# Patient Record
Sex: FEMALE | URBAN - METROPOLITAN AREA
[De-identification: names, ages, dates, MRNs, and addresses within clinical notes are randomized per-mention and may not be internally consistent; named-entity substitution may affect disease eponyms.]

---

## 2018-06-07 ENCOUNTER — OFFICE VISIT (OUTPATIENT)
Dept: URBAN - METROPOLITAN AREA CLINIC 48 | Facility: CLINIC | Age: 63
End: 2018-06-07
Payer: COMMERCIAL

## 2018-06-07 DIAGNOSIS — H02.834 DERMATOCHALASIS OF LEFT UPPER EYELID: ICD-10-CM

## 2018-06-07 DIAGNOSIS — H02.831 DERMATOCHALASIS OF RIGHT UPPER EYELID: Primary | ICD-10-CM

## 2018-06-07 PROCEDURE — 92081 LIMITED VISUAL FIELD XM: CPT | Performed by: OPHTHALMOLOGY

## 2018-06-07 PROCEDURE — 99214 OFFICE O/P EST MOD 30 MIN: CPT | Performed by: OPHTHALMOLOGY

## 2018-06-07 ASSESSMENT — INTRAOCULAR PRESSURE
OD: 14
OS: 14

## 2018-06-07 NOTE — IMPRESSION/PLAN
Impression: Dermatochalasis of right upper eyelid: H02.831. Plan: Discussed diagnosis in detail with patient. Discussed treatment options with patient. HVF does not meet criteria. Retest 1 year. Patient can opt for cosmetic sx.

## 2019-02-13 ENCOUNTER — OFFICE VISIT (OUTPATIENT)
Dept: URBAN - METROPOLITAN AREA CLINIC 48 | Facility: CLINIC | Age: 64
End: 2019-02-13
Payer: COMMERCIAL

## 2019-02-13 DIAGNOSIS — H43.811 VITREOUS DETACHMENT OF RIGHT EYE: ICD-10-CM

## 2019-02-13 DIAGNOSIS — E11.9 TYPE 2 DIABETES MELLITUS WITHOUT COMPLICATIONS: Primary | ICD-10-CM

## 2019-02-13 PROCEDURE — 92014 COMPRE OPH EXAM EST PT 1/>: CPT | Performed by: OPTOMETRIST

## 2019-02-13 ASSESSMENT — INTRAOCULAR PRESSURE
OS: 13
OD: 12

## 2019-02-13 NOTE — IMPRESSION/PLAN
Impression: Vitreous detachment of right eye: H43.811. Plan: Discussed Dx of posterior vitreous detachment. Discussed signs and symptoms of retinal tear/detachment. Pt to RTC PRN with any change to visual status. R/C 4-6 weeks for DFE to ensure no changes.

## 2019-02-13 NOTE — IMPRESSION/PLAN
Impression: Type 2 diabetes mellitus without complications: C20.0. Plan: No retinopathy found on today's examination. Discussed importance of blood sugar, blood pressure and cholesterol control. Letter with today's examination results sent to managing provider.  RTC 1 year for [facility] SERVICES [facility]

## 2019-03-21 ENCOUNTER — OFFICE VISIT (OUTPATIENT)
Dept: URBAN - METROPOLITAN AREA CLINIC 48 | Facility: CLINIC | Age: 64
End: 2019-03-21
Payer: COMMERCIAL

## 2019-03-21 DIAGNOSIS — H43.813 VITREOUS DEGENERATION, BILATERAL: Primary | ICD-10-CM

## 2019-03-21 DIAGNOSIS — H25.813 COMBINED FORMS OF AGE-RELATED CATARACT, BILATERAL: ICD-10-CM

## 2019-03-21 PROCEDURE — 92134 CPTRZ OPH DX IMG PST SGM RTA: CPT | Performed by: OPTOMETRIST

## 2019-03-21 PROCEDURE — 92014 COMPRE OPH EXAM EST PT 1/>: CPT | Performed by: OPTOMETRIST

## 2019-03-21 ASSESSMENT — INTRAOCULAR PRESSURE
OD: 14
OS: 14

## 2019-03-21 NOTE — IMPRESSION/PLAN
Impression: Combined forms of age-related cataract, bilateral: H25.813. OD>OS - early [facility] OD Plan: Discussed signs and symptoms of cataract progression. Pt to RTC PRN in the event of changes to visual status. No recommendation for surgery at present time. Will continue to monitor. RTC 6 months cat eval - or PRN if vision worsens.

## 2019-03-21 NOTE — IMPRESSION/PLAN
Impression: Type 2 diabetes mellitus without complications: K71.5. Plan: No retinopathy found on today's examination. Discussed importance of blood sugar, blood pressure and cholesterol control. Letter with today's examination results sent to managing provider.

## 2019-03-21 NOTE — IMPRESSION/PLAN
Impression: Vitreous degeneration, bilateral: H43.813. PVD OU - stable Plan: Discussed Dx of posterior vitreous detachment. Discussed signs and symptoms of retinal tear/detachment. Pt to RTC PRN with any change to visual status.

## 2019-11-18 ENCOUNTER — OFFICE VISIT (OUTPATIENT)
Dept: URBAN - METROPOLITAN AREA CLINIC 48 | Facility: CLINIC | Age: 64
End: 2019-11-18
Payer: COMMERCIAL

## 2019-11-18 DIAGNOSIS — H25.13 AGE-RELATED NUCLEAR CATARACT, BILATERAL: Primary | ICD-10-CM

## 2019-11-18 PROCEDURE — 92014 COMPRE OPH EXAM EST PT 1/>: CPT | Performed by: OPHTHALMOLOGY

## 2019-11-18 ASSESSMENT — VISUAL ACUITY
OS: 20/40
OD: 20/25

## 2019-11-18 ASSESSMENT — INTRAOCULAR PRESSURE
OD: 13
OS: 13

## 2019-11-18 ASSESSMENT — KERATOMETRY
OD: 43.25
OS: 42.88

## 2019-11-18 NOTE — IMPRESSION/PLAN
Impression: Age-related nuclear cataract, bilateral: H25.13. Plan: Discussed and reviewed diagnosis with patient, understood by patient. Cataract is not visually significant to patient, will continue to monitor. 

RTC 1 year f/u

## 2020-02-11 ENCOUNTER — OFFICE VISIT (OUTPATIENT)
Dept: URBAN - METROPOLITAN AREA CLINIC 48 | Facility: CLINIC | Age: 65
End: 2020-02-11
Payer: COMMERCIAL

## 2020-02-11 PROCEDURE — 92014 COMPRE OPH EXAM EST PT 1/>: CPT | Performed by: OPTOMETRIST

## 2020-02-11 ASSESSMENT — INTRAOCULAR PRESSURE
OS: 16
OD: 16

## 2020-02-11 NOTE — IMPRESSION/PLAN
Impression: Vitreous degeneration, bilateral: H43.813. Increase in floaters OD, potentially a partial detachment but is now advancing. Plan: RD precautions discussed with patient. Advised patient to call the office for increased flashes of light/floaters.

## 2020-03-12 ENCOUNTER — OFFICE VISIT (OUTPATIENT)
Dept: URBAN - METROPOLITAN AREA CLINIC 48 | Facility: CLINIC | Age: 65
End: 2020-03-12
Payer: COMMERCIAL

## 2020-03-12 PROCEDURE — 92014 COMPRE OPH EXAM EST PT 1/>: CPT | Performed by: OPTOMETRIST

## 2020-03-12 ASSESSMENT — INTRAOCULAR PRESSURE
OD: 17
OS: 18

## 2020-03-12 NOTE — IMPRESSION/PLAN
Impression: Vitreous degeneration, bilateral: H43.813. Plan: Discuss with patient. 

RTC in 9 months DFE for DM, possible cat eval

## 2020-03-12 NOTE — IMPRESSION/PLAN
Impression: Combined forms of age-related cataract, bilateral: H25.813. Plan: Discussed with patient, effects of cataracts. Will monitor for now. 

see plan 1

## 2020-07-16 ENCOUNTER — OFFICE VISIT (OUTPATIENT)
Dept: URBAN - METROPOLITAN AREA CLINIC 48 | Facility: CLINIC | Age: 65
End: 2020-07-16
Payer: COMMERCIAL

## 2020-07-16 PROCEDURE — 92014 COMPRE OPH EXAM EST PT 1/>: CPT | Performed by: OPTOMETRIST

## 2020-07-16 PROCEDURE — 92134 CPTRZ OPH DX IMG PST SGM RTA: CPT | Performed by: OPTOMETRIST

## 2020-07-16 ASSESSMENT — INTRAOCULAR PRESSURE
OD: 15
OS: 12

## 2020-07-16 NOTE — IMPRESSION/PLAN
Impression: Vitreous degeneration, bilateral: H43.813. very strong reaction OS with floaters, flashes, and decreased vision With [facility] patient was seeing 20/25-, blur is probably from the floaters or possibly from recent VMT Because of strong response, we will monitor patient more closely Plan: Discuss with patient. RD precautions given.  

RTC 2-3 weeks DFE with Dr. Agata Kimble

## 2020-08-07 ENCOUNTER — OFFICE VISIT (OUTPATIENT)
Dept: URBAN - METROPOLITAN AREA CLINIC 48 | Facility: CLINIC | Age: 65
End: 2020-08-07
Payer: COMMERCIAL

## 2020-08-07 PROCEDURE — 92014 COMPRE OPH EXAM EST PT 1/>: CPT | Performed by: OPHTHALMOLOGY

## 2020-08-07 ASSESSMENT — INTRAOCULAR PRESSURE
OD: 17
OS: 15

## 2020-08-07 NOTE — IMPRESSION/PLAN
Impression: Vitreous degeneration, bilateral: H43.813. Plan: depressed to aura 360 degrees U, no tears no RD. Posterior vitreous detachment accounts for the patient's complaints. There is no evidence of retinal pathology. All signs and risks of retinal detachment and tears were discussed in detail. Patient instructed to call the office immediately if any symptoms noted.   

RTC 6 months DE

## 2020-08-07 NOTE — IMPRESSION/PLAN
Impression: Combined forms of age-related cataract, bilateral: H25.813. Plan: May be cause of decrease vision OS.  


RTC 6 months CAt eval if patient is symptomatic

## 2021-02-10 ENCOUNTER — OFFICE VISIT (OUTPATIENT)
Dept: URBAN - METROPOLITAN AREA CLINIC 48 | Facility: CLINIC | Age: 66
End: 2021-02-10
Payer: COMMERCIAL

## 2021-02-10 PROCEDURE — 92014 COMPRE OPH EXAM EST PT 1/>: CPT | Performed by: OPHTHALMOLOGY

## 2021-02-10 ASSESSMENT — INTRAOCULAR PRESSURE
OD: 14
OS: 12

## 2021-02-10 NOTE — IMPRESSION/PLAN
Impression: Dry eye syndrome of bilateral lacrimal glands: H04.123. Plan: Recommend patient to use AFT 1-4 times a day OU ( brand name preferred). Hand out of different Dry eye treatment recommendations given to patient.  


 follow up

## 2021-02-10 NOTE — IMPRESSION/PLAN
Impression: Vitreous degeneration, bilateral: H43.813. No retina breaks  Plan: Posterior vitreous detachment accounts for the patient's complaints. There is no evidence of retinal pathology. All signs and risks of retinal detachment and tears were discussed in detail. Patient instructed to call the office immediately if any symptoms noted.   

RTC 3 months DE OU

## 2021-05-11 ENCOUNTER — OFFICE VISIT (OUTPATIENT)
Dept: URBAN - METROPOLITAN AREA CLINIC 48 | Facility: CLINIC | Age: 66
End: 2021-05-11
Payer: COMMERCIAL

## 2021-05-11 PROCEDURE — 92014 COMPRE OPH EXAM EST PT 1/>: CPT | Performed by: OPHTHALMOLOGY

## 2021-05-11 ASSESSMENT — KERATOMETRY
OD: 43.13
OS: 42.88

## 2021-05-11 NOTE — IMPRESSION/PLAN
Impression: Age-related nuclear cataract, bilateral: H25.13. Plan: Symptoms presented today associated with dry eye and does not qualify for sx at this time. Will treat dry eye first then reassess cataracts. Patient aware to call clinic prior to 9 months if new problems or concerns arise prior to next scheduled appt. RTC 9 months for CAT EVAL.

## 2021-05-11 NOTE — IMPRESSION/PLAN
Impression: Dry eye syndrome of bilateral lacrimal glands: H04.123. Plan: Recommend patient to use AFT at least QD and up to QID OU ( brand name preferred). Hand out of different Dry eye treatment recommendations given to patient. Patient aware to call clinic if condition does not improve.  

see plan 1

## 2022-03-16 ENCOUNTER — OFFICE VISIT (OUTPATIENT)
Dept: URBAN - METROPOLITAN AREA CLINIC 48 | Facility: CLINIC | Age: 67
End: 2022-03-16
Payer: COMMERCIAL

## 2022-03-16 DIAGNOSIS — H40.013 OPEN ANGLE WITH BORDERLINE FINDINGS, LOW RISK, BILATERAL: ICD-10-CM

## 2022-03-16 DIAGNOSIS — H04.123 DRY EYE SYNDROME OF BILATERAL LACRIMAL GLANDS: ICD-10-CM

## 2022-03-16 PROCEDURE — 92014 COMPRE OPH EXAM EST PT 1/>: CPT | Performed by: OPHTHALMOLOGY

## 2022-03-16 ASSESSMENT — VISUAL ACUITY
OD: 20/20
OS: 20/20

## 2022-03-16 ASSESSMENT — KERATOMETRY
OD: 43.13
OS: 42.88

## 2022-03-16 ASSESSMENT — INTRAOCULAR PRESSURE
OS: 11
OD: 14

## 2022-03-16 NOTE — IMPRESSION/PLAN
Impression: Type 2 diabetes mellitus without complications: I63.0.  Plan: No diabetic retinopathy on today's exam

Recommend yearly diabetic exam with Dr. Walt Varghese

## 2022-03-16 NOTE — IMPRESSION/PLAN
Impression: Dry eye syndrome of bilateral lacrimal glands: H04.123. Plan: Recommend patient to use AFT 1-4 times a day OU ( brand name preferred). Hand out of different Dry eye treatment recommendations given to patient. start Pat[place] 0.7 % QD OU [place] 1-4 times a day OU 


monitor

## 2022-03-16 NOTE — IMPRESSION/PLAN
Impression: Open angle with borderline findings, low risk, bilateral: H40.013. Plan: CDR asymmetry RTC 3 months IOP check with OCT nerve

## 2022-03-16 NOTE — IMPRESSION/PLAN
Impression: Age-related nuclear cataract, bilateral: H25.13. Plan: Not visually significant to patient Will monitor RTC 3 months cat eval 
patient very symptomatic

## 2022-07-05 ENCOUNTER — OFFICE VISIT (OUTPATIENT)
Dept: URBAN - METROPOLITAN AREA CLINIC 48 | Facility: CLINIC | Age: 67
End: 2022-07-05
Payer: COMMERCIAL

## 2022-07-05 DIAGNOSIS — E11.9 TYPE 2 DIABETES MELLITUS WITHOUT COMPLICATIONS: ICD-10-CM

## 2022-07-05 DIAGNOSIS — H02.831 DERMATOCHALASIS OF RIGHT UPPER EYELID: ICD-10-CM

## 2022-07-05 DIAGNOSIS — H40.013 OPEN ANGLE WITH BORDERLINE FINDINGS, LOW RISK, BILATERAL: Primary | ICD-10-CM

## 2022-07-05 DIAGNOSIS — H04.123 DRY EYE SYNDROME OF BILATERAL LACRIMAL GLANDS: ICD-10-CM

## 2022-07-05 DIAGNOSIS — H02.834 DERMATOCHALASIS OF LEFT UPPER EYELID: ICD-10-CM

## 2022-07-05 PROCEDURE — 99214 OFFICE O/P EST MOD 30 MIN: CPT | Performed by: OPHTHALMOLOGY

## 2022-07-05 ASSESSMENT — VISUAL ACUITY
OS: 20/25
OD: 20/25

## 2022-07-05 ASSESSMENT — INTRAOCULAR PRESSURE
OD: 14
OS: 13

## 2022-07-05 NOTE — IMPRESSION/PLAN
Impression: Dry eye syndrome of bilateral lacrimal glands: H04.123.  Plan: Blink up to 4 times a day

## 2022-07-05 NOTE — IMPRESSION/PLAN
Impression: Type 2 diabetes mellitus without complications: H63.5.  Plan: No diabetic retinopathy on today's exam

Recommend yearly diabetic exam with Dr. Jono Reynolds

## 2022-07-05 NOTE — IMPRESSION/PLAN
Impression: Dermatochalasis of right upper eyelid: H02.831.  Plan: Refer to Oculoplastic next available with VF ptosis prior

## 2022-07-05 NOTE — IMPRESSION/PLAN
Impression: Open angle with borderline findings, low risk, bilateral: H40.013.  Plan: Monitor off drops

## 2023-02-17 ENCOUNTER — OFFICE VISIT (OUTPATIENT)
Dept: URBAN - METROPOLITAN AREA CLINIC 48 | Facility: CLINIC | Age: 68
End: 2023-02-17
Payer: COMMERCIAL

## 2023-02-17 DIAGNOSIS — H25.13 AGE-RELATED NUCLEAR CATARACT, BILATERAL: ICD-10-CM

## 2023-02-17 DIAGNOSIS — H40.013 OPEN ANGLE WITH BORDERLINE FINDINGS, LOW RISK, BILATERAL: ICD-10-CM

## 2023-02-17 DIAGNOSIS — E11.9 DIABETES MELLITUS TYPE 2 WITHOUT MENTION OF COMPLICATION: Primary | ICD-10-CM

## 2023-02-17 PROCEDURE — 99214 OFFICE O/P EST MOD 30 MIN: CPT | Performed by: OPHTHALMOLOGY

## 2023-02-17 ASSESSMENT — INTRAOCULAR PRESSURE
OD: 13
OS: 13

## 2023-02-17 NOTE — IMPRESSION/PLAN
Impression: Open angle with borderline findings, low risk, bilateral: H40.013. Plan: Continue to monitor off drops at this time, discussed diagnosis with patient, patient understands.  

RTC 6mo IOP check, VF

## 2023-02-17 NOTE — IMPRESSION/PLAN
Impression: Diabetes mellitus Type 2 without mention of complication: T75.1. Plan: Diabetes type II: no background retinopathy, no signs of neovascularization noted. Discussed ocular and systemic benefits of blood sugar control. 

RTC 1yr DE

## 2023-02-17 NOTE — IMPRESSION/PLAN
Impression: Age-related nuclear cataract, bilateral: H25.13. Plan: Visually significant per patient.  Recommend Eval

RTC 1-3wk CAT EVal w/o DIL

## 2023-03-29 ENCOUNTER — OFFICE VISIT (OUTPATIENT)
Dept: URBAN - METROPOLITAN AREA CLINIC 48 | Facility: CLINIC | Age: 68
End: 2023-03-29
Payer: COMMERCIAL

## 2023-03-29 DIAGNOSIS — H25.13 AGE-RELATED NUCLEAR CATARACT, BILATERAL: Primary | ICD-10-CM

## 2023-03-29 PROCEDURE — 99214 OFFICE O/P EST MOD 30 MIN: CPT | Performed by: OPHTHALMOLOGY

## 2023-03-29 ASSESSMENT — KERATOMETRY
OD: 43.25
OS: 42.88

## 2023-03-29 ASSESSMENT — VISUAL ACUITY
OS: 20/20
OD: 20/20

## 2023-03-29 ASSESSMENT — INTRAOCULAR PRESSURE
OD: 17
OS: 22

## 2023-08-29 ENCOUNTER — OFFICE VISIT (OUTPATIENT)
Dept: URBAN - METROPOLITAN AREA CLINIC 48 | Facility: CLINIC | Age: 68
End: 2023-08-29
Payer: COMMERCIAL

## 2023-08-29 DIAGNOSIS — H40.013 OPEN ANGLE WITH BORDERLINE FINDINGS, LOW RISK, BILATERAL: Primary | ICD-10-CM

## 2023-08-29 DIAGNOSIS — H25.813 COMBINED FORMS OF AGE-RELATED CATARACT, BILATERAL: ICD-10-CM

## 2023-08-29 PROCEDURE — 76514 ECHO EXAM OF EYE THICKNESS: CPT | Performed by: OPHTHALMOLOGY

## 2023-08-29 PROCEDURE — 99214 OFFICE O/P EST MOD 30 MIN: CPT | Performed by: OPHTHALMOLOGY

## 2023-08-29 PROCEDURE — 92133 CPTRZD OPH DX IMG PST SGM ON: CPT | Performed by: OPHTHALMOLOGY

## 2023-08-29 PROCEDURE — 92083 EXTENDED VISUAL FIELD XM: CPT | Performed by: OPHTHALMOLOGY

## 2023-08-29 ASSESSMENT — INTRAOCULAR PRESSURE
OD: 17
OS: 16

## 2023-11-30 ENCOUNTER — OFFICE VISIT (OUTPATIENT)
Dept: URBAN - METROPOLITAN AREA CLINIC 48 | Facility: CLINIC | Age: 68
End: 2023-11-30
Payer: COMMERCIAL

## 2023-11-30 DIAGNOSIS — H25.11 AGE-RELATED NUCLEAR CATARACT, RIGHT EYE: ICD-10-CM

## 2023-11-30 PROCEDURE — 99214 OFFICE O/P EST MOD 30 MIN: CPT | Performed by: OPHTHALMOLOGY

## 2023-11-30 ASSESSMENT — VISUAL ACUITY
OD: 20/20
OS: 20/20

## 2023-11-30 ASSESSMENT — KERATOMETRY
OS: 42.88
OD: 43.25

## 2023-11-30 ASSESSMENT — INTRAOCULAR PRESSURE
OS: 14
OD: 17

## 2024-01-10 ENCOUNTER — TECH ONLY (OUTPATIENT)
Dept: URBAN - METROPOLITAN AREA CLINIC 48 | Facility: CLINIC | Age: 69
End: 2024-01-10
Payer: COMMERCIAL

## 2024-01-10 DIAGNOSIS — H25.13 AGE-RELATED NUCLEAR CATARACT, BILATERAL: Primary | ICD-10-CM

## 2024-01-10 RX ORDER — MOXIFLOXACIN 5 MG/ML
0.5 % SOLUTION/ DROPS OPHTHALMIC
Qty: 5 | Refills: 2 | Status: ACTIVE
Start: 2024-01-10

## 2024-01-10 RX ORDER — KETOROLAC TROMETHAMINE 5 MG/ML
0.5 % SOLUTION OPHTHALMIC
Qty: 5 | Refills: 2 | Status: ACTIVE
Start: 2024-01-10

## 2024-01-10 RX ORDER — PREDNISOLONE ACETATE 10 MG/ML
1 % SUSPENSION/ DROPS OPHTHALMIC
Qty: 5 | Refills: 2 | Status: ACTIVE
Start: 2024-01-10

## 2024-01-10 ASSESSMENT — PACHYMETRY
OD: 23.79
OS: 3.02
OD: 2.96
OS: 23.73

## 2024-01-10 ASSESSMENT — KERATOMETRY
OS: 42.65
OD: 42.88

## 2025-02-05 NOTE — IMPRESSION/PLAN
Impression: Age-related nuclear cataract, bilateral: H25.13. Plan: Discussed and reviewed diagnosis with patient, understood by patient. Cataract is not visually significant to patient, will continue to monitor. Patient elects to get new refraction. 



rtc as scheduled
yes